# Patient Record
Sex: FEMALE | URBAN - METROPOLITAN AREA
[De-identification: names, ages, dates, MRNs, and addresses within clinical notes are randomized per-mention and may not be internally consistent; named-entity substitution may affect disease eponyms.]

---

## 2020-03-10 ENCOUNTER — OFFICE VISIT (OUTPATIENT)
Dept: URBAN - METROPOLITAN AREA CLINIC 62 | Facility: CLINIC | Age: 70
End: 2020-03-10
Payer: COMMERCIAL

## 2020-03-10 DIAGNOSIS — E05.00 THYROTOXICOSIS W/ DIFFUSE GOITER W/O THYROTOXIC CRISIS: ICD-10-CM

## 2020-03-10 DIAGNOSIS — H04.123 DRY EYE SYNDROME OF BILATERAL LACRIMAL GLANDS: ICD-10-CM

## 2020-03-10 DIAGNOSIS — H25.813 COMBINED FORMS OF AGE-RELATED CATARACT, BILATERAL: ICD-10-CM

## 2020-03-10 DIAGNOSIS — H53.2 DIPLOPIA: Primary | ICD-10-CM

## 2020-03-10 DIAGNOSIS — H35.371 PUCKERING OF MACULA, RIGHT EYE: ICD-10-CM

## 2020-03-10 PROCEDURE — 99204 OFFICE O/P NEW MOD 45 MIN: CPT | Performed by: OPTOMETRIST

## 2020-03-10 ASSESSMENT — VISUAL ACUITY
OS: 20/25
OD: 20/25

## 2020-03-10 ASSESSMENT — INTRAOCULAR PRESSURE
OD: 14
OS: 13

## 2020-03-10 ASSESSMENT — KERATOMETRY
OS: 42.88
OD: 43.25

## 2020-03-10 NOTE — IMPRESSION/PLAN
Impression: Diplopia: H53.2. Plan: Discussed diagnosed with patient. Intermittent diplopia. If symptoms worsen or become constant, return to clinic.

## 2020-03-10 NOTE — IMPRESSION/PLAN
Impression: Puckering of macula, right eye: H35.371. Plan: Discussed diagnosis with patient in detail. No treatment is required at this time. Will continue to observe condition and/or symptoms. Patient instructed to call if condition gets worse.

## 2020-03-10 NOTE — IMPRESSION/PLAN
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 Plan: Discussed diagnosed with patient. Intermittent binocular diplopia with supraversion. Observe and RTC if diplopia worsens. Continue care with PCP.